# Patient Record
Sex: FEMALE
[De-identification: names, ages, dates, MRNs, and addresses within clinical notes are randomized per-mention and may not be internally consistent; named-entity substitution may affect disease eponyms.]

---

## 2018-08-19 ENCOUNTER — HOSPITAL ENCOUNTER (EMERGENCY)
Dept: HOSPITAL 14 - H.ER | Age: 53
Discharge: HOME | End: 2018-08-19
Payer: COMMERCIAL

## 2018-08-19 VITALS — OXYGEN SATURATION: 100 % | RESPIRATION RATE: 18 BRPM

## 2018-08-19 VITALS — SYSTOLIC BLOOD PRESSURE: 122 MMHG | TEMPERATURE: 98.2 F | HEART RATE: 74 BPM | DIASTOLIC BLOOD PRESSURE: 74 MMHG

## 2018-08-19 DIAGNOSIS — M54.2: ICD-10-CM

## 2018-08-19 DIAGNOSIS — E03.9: ICD-10-CM

## 2018-08-19 DIAGNOSIS — L73.9: Primary | ICD-10-CM

## 2018-08-19 DIAGNOSIS — Z90.710: ICD-10-CM

## 2018-08-19 PROCEDURE — 96372 THER/PROPH/DIAG INJ SC/IM: CPT

## 2018-08-19 PROCEDURE — 99282 EMERGENCY DEPT VISIT SF MDM: CPT

## 2018-08-19 NOTE — ED PDOC
HPI: Skin/Bite Injury


Time Seen by Provider: 18 01:23


Chief Complaint (Nursing): Abnormal Skin Integrity


Chief Complaint (Provider): Rash


History Per: Patient


History/Exam Limitations: no limitations


Onset/Duration Of Symptoms: Days (x 6)


Additional Complaint(s): 


53 year old female with a history of hypothyroidism presents to the ED for 

evaluation of an itchy rash on her chin associated with bumps and little 

pimples. The rash started six days ago after tweezing her chin hair. The 

patient has reported taking Benadryl yesterday, but did not take any 

medications today. Throughout today she also noticed some swelling to her chin 

and right side of her neck. Denies fever/chills. No other complaints at present.





PMD: Dr. Perez





Past Medical History


Reviewed: Historical Data, Nursing Documentation, Vital Signs


Vital Signs: 


 Last Vital Signs











Temp  98.2 F   18 03:07


 


Pulse  74   18 03:07


 


Resp  18   18 03:07


 


BP  122/74   18 03:07


 


Pulse Ox  100   18 05:01














- Medical History


PMH: Hypothyroidism





- Surgical History


Other surgeries: hysterectomy and hemithyroidectomy





- Family History


Family History: States: Unknown Family Hx





- Home Medications


Home Medications: 


 Ambulatory Orders











 Medication  Instructions  Recorded


 


Cephalexin [Keflex] 500 mg PO TID #21 capsule 18


 


Clindamycin Phosphate 1 applic TOP BID #120 ml 18


 


Naproxen 500 mg PO BID PRN #20 tab 18














- Allergies


Allergies/Adverse Reactions: 


 Allergies











Allergy/AdvReac Type Severity Reaction Status Date / Time


 


No Known Allergies Allergy   Verified 18 01:06














Review of Systems


ROS Statement: Except As Marked, All Systems Reviewed And Found Negative


Skin: Positive for: Rash (on chin; associated with pimples and swelling)





Physical Exam





- Reviewed


Nursing Documentation Reviewed: Yes


Vital Signs Reviewed: Yes





- Physical Exam


Comments: 


GENERAL APPEARANCE: Patient is awake, alert, oriented x 3, resting comfortably.


Skin: folliculitis noted to the chin, submental region, and right jawline. (+) 

mild erythema, (+) multiple pustules; (-) warmth,  (-) crusting/discharge


ENMT:  Mucous membranes moist.  Airway patent:  (-) stridor.  Pharynx: Uvula 

midline  (-) swelling, (-) erythema. Mandible with FROM, TMJ nontender.


Neck: Supple, FROM (+) right anterior cervical chain lymphadenopathy 


Cardiac: regular rate and rhythm


Respiratory: lungs clear to auscultation bilaterally


Neuro: Mental status as above, gait steady, speech clear. (-) focal deficit (-) 

aphasia








- ECG


O2 Sat by Pulse Oximetry: 100 (RA)


Pulse Ox Interpretation: Normal





Medical Decision Making


Medical Decision Makin:33


Impression: Folliculitis of chin


Initial Plan: 


--Toradol 30 mg IM 


--Keflex 500 mg PO 


--Re-evaluation








215


On re-evaluation, patient reports improvement of symptoms. On exam, patient 

remains AAOx3, in no acute distress. Lungs clear to auscultation, cardiac RRR, 

repeat neuro exam shows no focal findings. Vitals stable.


Lab / Diagnostic results d/w the patient in great detail. Diagnosis of 

folliculitis d/w the patient. 


Based on history, exam and diagnostic results, plan will be for outpatient 

follow up. 


Patient instructed to follow-up with pmd / referral provided / the clinic  in 1-

2 days without fail. Advised to take medication as prescribed. Return to the 

emergency room at any time for any new or worsening symptoms. Patient states 

she fully agrees with and understands discharge instructions. States that she 

agrees with the plan and disposition. Verbalized and repeated discharge 

instructions and plan. I have given the patient opportunity to ask any 

additional questions.





Disposition





- Clinical Impression


Clinical Impression: 


 Folliculitis, Neck pain








- Patient ED Disposition


Is Patient to be Admitted: No


Counseled Patient/Family Regarding: Studies Performed, Diagnosis, Need For 

Followup, Rx Given





- Disposition


Disposition: Routine/Home


Disposition Time: 02:19


Condition: STABLE


Additional Instructions: 


The emergency medical care you received today was directed at your acute 

symptoms. If you were prescribed any medication, please fill it and take as 

directed. It may take several days for your symptoms to resolve. Return to the 

Emergency Department if your symptoms worsen, do not improve, or if you have 

any other problems.


   


Please contact your doctor in 2 days for re-evaluation and follow up / or call 

one of the physicians/clinics you have been referred to that are listed on the 

Patient Visit Information form that is included in your discharge packet. Bring 

any paperwork you were given at discharge with you along with any medications 

you are taking to your follow up visit. Our treatment cannot replace ongoing 

medical care by a primary care provider (PCP) outside of the emergency 

department.


Prescriptions: 


Cephalexin [Keflex] 500 mg PO TID #21 capsule


Clindamycin Phosphate 1 applic TOP BID #120 ml


Naproxen 500 mg PO BID PRN #20 tab


 PRN Reason: Pain, Swelling


Instructions:  Folliculitis (DC)


Forms:  [facility] (English)


Print Language: ENGLISH





- POA


Present On Arrival: None